# Patient Record
Sex: FEMALE
[De-identification: names, ages, dates, MRNs, and addresses within clinical notes are randomized per-mention and may not be internally consistent; named-entity substitution may affect disease eponyms.]

---

## 2019-04-15 ENCOUNTER — APPOINTMENT (OUTPATIENT)
Dept: ANTEPARTUM | Facility: CLINIC | Age: 39
End: 2019-04-15
Payer: COMMERCIAL

## 2019-04-15 PROBLEM — Z00.00 ENCOUNTER FOR PREVENTIVE HEALTH EXAMINATION: Status: ACTIVE | Noted: 2019-04-15

## 2019-04-15 PROCEDURE — 76817 TRANSVAGINAL US OBSTETRIC: CPT

## 2019-04-15 PROCEDURE — 76811 OB US DETAILED SNGL FETUS: CPT

## 2019-05-10 ENCOUNTER — APPOINTMENT (OUTPATIENT)
Dept: ANTEPARTUM | Facility: CLINIC | Age: 39
End: 2019-05-10
Payer: COMMERCIAL

## 2019-05-10 PROCEDURE — 76816 OB US FOLLOW-UP PER FETUS: CPT

## 2019-05-10 PROCEDURE — 76819 FETAL BIOPHYS PROFIL W/O NST: CPT

## 2019-05-10 PROCEDURE — 76820 UMBILICAL ARTERY ECHO: CPT

## 2019-05-31 ENCOUNTER — APPOINTMENT (OUTPATIENT)
Dept: ANTEPARTUM | Facility: CLINIC | Age: 39
End: 2019-05-31
Payer: COMMERCIAL

## 2019-05-31 PROCEDURE — 76819 FETAL BIOPHYS PROFIL W/O NST: CPT

## 2019-05-31 PROCEDURE — 76816 OB US FOLLOW-UP PER FETUS: CPT

## 2019-06-03 ENCOUNTER — APPOINTMENT (OUTPATIENT)
Dept: ULTRASOUND IMAGING | Facility: HOSPITAL | Age: 39
End: 2019-06-03

## 2019-06-18 ENCOUNTER — APPOINTMENT (OUTPATIENT)
Dept: ANTEPARTUM | Facility: CLINIC | Age: 39
End: 2019-06-18
Payer: COMMERCIAL

## 2019-06-18 PROCEDURE — 76816 OB US FOLLOW-UP PER FETUS: CPT

## 2019-06-18 PROCEDURE — 76819 FETAL BIOPHYS PROFIL W/O NST: CPT

## 2019-06-21 ENCOUNTER — INPATIENT (INPATIENT)
Facility: HOSPITAL | Age: 39
LOS: 2 days | Discharge: HOME | End: 2019-06-24
Attending: OBSTETRICS & GYNECOLOGY | Admitting: OBSTETRICS & GYNECOLOGY
Payer: COMMERCIAL

## 2019-06-21 VITALS — HEIGHT: 60 IN | WEIGHT: 138.89 LBS

## 2019-06-21 DIAGNOSIS — O26.899 OTHER SPECIFIED PREGNANCY RELATED CONDITIONS, UNSPECIFIED TRIMESTER: ICD-10-CM

## 2019-06-21 DIAGNOSIS — Z3A.00 WEEKS OF GESTATION OF PREGNANCY NOT SPECIFIED: ICD-10-CM

## 2019-06-21 LAB
BASOPHILS # BLD AUTO: 0.03 K/UL — SIGNIFICANT CHANGE UP (ref 0–0.2)
BASOPHILS NFR BLD AUTO: 0.3 % — SIGNIFICANT CHANGE UP (ref 0–2)
BLD GP AB SCN SERPL QL: NEGATIVE — SIGNIFICANT CHANGE UP
EOSINOPHIL # BLD AUTO: 0.08 K/UL — SIGNIFICANT CHANGE UP (ref 0–0.5)
EOSINOPHIL NFR BLD AUTO: 0.7 % — SIGNIFICANT CHANGE UP (ref 0–6)
HCT VFR BLD CALC: 37.7 % — SIGNIFICANT CHANGE UP (ref 34.5–45)
HGB BLD-MCNC: 12.4 G/DL — SIGNIFICANT CHANGE UP (ref 11.5–15.5)
IMM GRANULOCYTES NFR BLD AUTO: 0.4 % — SIGNIFICANT CHANGE UP (ref 0–1.5)
LYMPHOCYTES # BLD AUTO: 1.94 K/UL — SIGNIFICANT CHANGE UP (ref 1–3.3)
LYMPHOCYTES # BLD AUTO: 17.1 % — SIGNIFICANT CHANGE UP (ref 13–44)
MCHC RBC-ENTMCNC: 29.7 PG — SIGNIFICANT CHANGE UP (ref 27–34)
MCHC RBC-ENTMCNC: 32.9 GM/DL — SIGNIFICANT CHANGE UP (ref 32–36)
MCV RBC AUTO: 90.4 FL — SIGNIFICANT CHANGE UP (ref 80–100)
MONOCYTES # BLD AUTO: 0.95 K/UL — HIGH (ref 0–0.9)
MONOCYTES NFR BLD AUTO: 8.4 % — SIGNIFICANT CHANGE UP (ref 2–14)
NEUTROPHILS # BLD AUTO: 8.31 K/UL — HIGH (ref 1.8–7.4)
NEUTROPHILS NFR BLD AUTO: 73.1 % — SIGNIFICANT CHANGE UP (ref 43–77)
NRBC # BLD: 0 /100 WBCS — SIGNIFICANT CHANGE UP (ref 0–0)
PLATELET # BLD AUTO: 247 K/UL — SIGNIFICANT CHANGE UP (ref 150–400)
RBC # BLD: 4.17 M/UL — SIGNIFICANT CHANGE UP (ref 3.8–5.2)
RBC # FLD: 14.5 % — SIGNIFICANT CHANGE UP (ref 10.3–14.5)
RH IG SCN BLD-IMP: POSITIVE — SIGNIFICANT CHANGE UP
WBC # BLD: 11.36 K/UL — HIGH (ref 3.8–10.5)
WBC # FLD AUTO: 11.36 K/UL — HIGH (ref 3.8–10.5)

## 2019-06-21 RX ORDER — SODIUM CHLORIDE 9 MG/ML
1000 INJECTION, SOLUTION INTRAVENOUS
Refills: 0 | Status: DISCONTINUED | OUTPATIENT
Start: 2019-06-21 | End: 2019-06-22

## 2019-06-21 RX ORDER — OXYTOCIN 10 UNIT/ML
333.33 VIAL (ML) INJECTION
Qty: 20 | Refills: 0 | Status: DISCONTINUED | OUTPATIENT
Start: 2019-06-21 | End: 2019-06-24

## 2019-06-21 RX ORDER — DINOPROSTONE 10 MG/241MG
10 INSERT VAGINAL ONCE
Refills: 0 | Status: COMPLETED | OUTPATIENT
Start: 2019-06-21 | End: 2019-06-21

## 2019-06-21 RX ORDER — DINOPROSTONE 10 MG/241MG
10 INSERT VAGINAL ONCE
Refills: 0 | Status: DISCONTINUED | OUTPATIENT
Start: 2019-06-21 | End: 2019-06-21

## 2019-06-21 RX ORDER — CITRIC ACID/SODIUM CITRATE 300-500 MG
15 SOLUTION, ORAL ORAL EVERY 6 HOURS
Refills: 0 | Status: DISCONTINUED | OUTPATIENT
Start: 2019-06-21 | End: 2019-06-22

## 2019-06-21 RX ADMIN — SODIUM CHLORIDE 125 MILLILITER(S): 9 INJECTION, SOLUTION INTRAVENOUS at 22:05

## 2019-06-21 RX ADMIN — DINOPROSTONE 10 MILLIGRAM(S): 10 INSERT VAGINAL at 22:30

## 2019-06-22 LAB
T PALLIDUM AB TITR SER: NEGATIVE — SIGNIFICANT CHANGE UP

## 2019-06-22 RX ORDER — LANOLIN
1 OINTMENT (GRAM) TOPICAL EVERY 6 HOURS
Refills: 0 | Status: DISCONTINUED | OUTPATIENT
Start: 2019-06-22 | End: 2019-06-24

## 2019-06-22 RX ORDER — FENTANYL/BUPIVACAINE/NS/PF 2MCG/ML-.1
250 PLASTIC BAG, INJECTION (ML) INJECTION
Refills: 0 | Status: DISCONTINUED | OUTPATIENT
Start: 2019-06-22 | End: 2019-06-23

## 2019-06-22 RX ORDER — CEFAZOLIN SODIUM 1 G
2000 VIAL (EA) INJECTION ONCE
Refills: 0 | Status: COMPLETED | OUTPATIENT
Start: 2019-06-22 | End: 2019-06-22

## 2019-06-22 RX ORDER — TETANUS TOXOID, REDUCED DIPHTHERIA TOXOID AND ACELLULAR PERTUSSIS VACCINE, ADSORBED 5; 2.5; 8; 8; 2.5 [IU]/.5ML; [IU]/.5ML; UG/.5ML; UG/.5ML; UG/.5ML
0.5 SUSPENSION INTRAMUSCULAR ONCE
Refills: 0 | Status: DISCONTINUED | OUTPATIENT
Start: 2019-06-22 | End: 2019-06-24

## 2019-06-22 RX ORDER — SIMETHICONE 80 MG/1
80 TABLET, CHEWABLE ORAL EVERY 4 HOURS
Refills: 0 | Status: DISCONTINUED | OUTPATIENT
Start: 2019-06-22 | End: 2019-06-24

## 2019-06-22 RX ORDER — OXYCODONE HYDROCHLORIDE 5 MG/1
5 TABLET ORAL ONCE
Refills: 0 | Status: DISCONTINUED | OUTPATIENT
Start: 2019-06-22 | End: 2019-06-23

## 2019-06-22 RX ORDER — GLYCERIN ADULT
1 SUPPOSITORY, RECTAL RECTAL AT BEDTIME
Refills: 0 | Status: DISCONTINUED | OUTPATIENT
Start: 2019-06-22 | End: 2019-06-24

## 2019-06-22 RX ORDER — AZITHROMYCIN 500 MG/1
500 TABLET, FILM COATED ORAL ONCE
Refills: 0 | Status: DISCONTINUED | OUTPATIENT
Start: 2019-06-22 | End: 2019-06-24

## 2019-06-22 RX ORDER — OXYCODONE HYDROCHLORIDE 5 MG/1
5 TABLET ORAL
Refills: 0 | Status: DISCONTINUED | OUTPATIENT
Start: 2019-06-22 | End: 2019-06-23

## 2019-06-22 RX ORDER — OXYTOCIN 10 UNIT/ML
1 VIAL (ML) INJECTION
Qty: 30 | Refills: 0 | Status: DISCONTINUED | OUTPATIENT
Start: 2019-06-22 | End: 2019-06-24

## 2019-06-22 RX ORDER — MAGNESIUM HYDROXIDE 400 MG/1
30 TABLET, CHEWABLE ORAL
Refills: 0 | Status: DISCONTINUED | OUTPATIENT
Start: 2019-06-22 | End: 2019-06-24

## 2019-06-22 RX ORDER — DIPHENHYDRAMINE HCL 50 MG
25 CAPSULE ORAL EVERY 6 HOURS
Refills: 0 | Status: DISCONTINUED | OUTPATIENT
Start: 2019-06-22 | End: 2019-06-24

## 2019-06-22 RX ORDER — IBUPROFEN 200 MG
600 TABLET ORAL EVERY 6 HOURS
Refills: 0 | Status: COMPLETED | OUTPATIENT
Start: 2019-06-22 | End: 2020-05-20

## 2019-06-22 RX ORDER — MORPHINE SULFATE 50 MG/1
4 CAPSULE, EXTENDED RELEASE ORAL ONCE
Refills: 0 | Status: DISCONTINUED | OUTPATIENT
Start: 2019-06-22 | End: 2019-06-23

## 2019-06-22 RX ORDER — OXYTOCIN 10 UNIT/ML
333.33 VIAL (ML) INJECTION
Qty: 20 | Refills: 0 | Status: DISCONTINUED | OUTPATIENT
Start: 2019-06-22 | End: 2019-06-24

## 2019-06-22 RX ORDER — DOCUSATE SODIUM 100 MG
100 CAPSULE ORAL
Refills: 0 | Status: DISCONTINUED | OUTPATIENT
Start: 2019-06-22 | End: 2019-06-24

## 2019-06-22 RX ORDER — KETOROLAC TROMETHAMINE 30 MG/ML
30 SYRINGE (ML) INJECTION EVERY 6 HOURS
Refills: 0 | Status: DISCONTINUED | OUTPATIENT
Start: 2019-06-22 | End: 2019-06-23

## 2019-06-22 RX ORDER — ACETAMINOPHEN 500 MG
975 TABLET ORAL EVERY 4 HOURS
Refills: 0 | Status: DISCONTINUED | OUTPATIENT
Start: 2019-06-22 | End: 2019-06-24

## 2019-06-22 RX ADMIN — Medication 100 MILLIGRAM(S): at 17:20

## 2019-06-22 RX ADMIN — Medication 1 MILLIUNIT(S)/MIN: at 07:54

## 2019-06-22 RX ADMIN — Medication 975 MILLIGRAM(S): at 23:50

## 2019-06-22 RX ADMIN — SODIUM CHLORIDE 125 MILLILITER(S): 9 INJECTION, SOLUTION INTRAVENOUS at 08:27

## 2019-06-22 RX ADMIN — Medication 30 MILLIGRAM(S): at 19:54

## 2019-06-22 RX ADMIN — SODIUM CHLORIDE 125 MILLILITER(S): 9 INJECTION, SOLUTION INTRAVENOUS at 01:21

## 2019-06-22 RX ADMIN — Medication 975 MILLIGRAM(S): at 22:10

## 2019-06-22 RX ADMIN — Medication 30 MILLIGRAM(S): at 22:10

## 2019-06-22 RX ADMIN — Medication 15 MILLILITER(S): at 17:21

## 2019-06-23 LAB
ANISOCYTOSIS BLD QL: SLIGHT — SIGNIFICANT CHANGE UP
BASOPHILS # BLD AUTO: 0 K/UL — SIGNIFICANT CHANGE UP (ref 0–0.2)
BASOPHILS NFR BLD AUTO: 0 % — SIGNIFICANT CHANGE UP (ref 0–2)
EOSINOPHIL # BLD AUTO: 0 K/UL — SIGNIFICANT CHANGE UP (ref 0–0.5)
EOSINOPHIL NFR BLD AUTO: 0 % — SIGNIFICANT CHANGE UP (ref 0–6)
GIANT PLATELETS BLD QL SMEAR: PRESENT — SIGNIFICANT CHANGE UP
HCT VFR BLD CALC: 29.1 % — LOW (ref 34.5–45)
HGB BLD-MCNC: 9.4 G/DL — LOW (ref 11.5–15.5)
LYMPHOCYTES # BLD AUTO: 1.99 K/UL — SIGNIFICANT CHANGE UP (ref 1–3.3)
LYMPHOCYTES # BLD AUTO: 9.6 % — LOW (ref 13–44)
MACROCYTES BLD QL: SLIGHT — SIGNIFICANT CHANGE UP
MANUAL SMEAR VERIFICATION: SIGNIFICANT CHANGE UP
MCHC RBC-ENTMCNC: 29.8 PG — SIGNIFICANT CHANGE UP (ref 27–34)
MCHC RBC-ENTMCNC: 32.3 GM/DL — SIGNIFICANT CHANGE UP (ref 32–36)
MCV RBC AUTO: 92.4 FL — SIGNIFICANT CHANGE UP (ref 80–100)
MICROCYTES BLD QL: SLIGHT — SIGNIFICANT CHANGE UP
MONOCYTES # BLD AUTO: 0.54 K/UL — SIGNIFICANT CHANGE UP (ref 0–0.9)
MONOCYTES NFR BLD AUTO: 2.6 % — SIGNIFICANT CHANGE UP (ref 2–14)
NEUTROPHILS # BLD AUTO: 18.2 K/UL — HIGH (ref 1.8–7.4)
NEUTROPHILS NFR BLD AUTO: 86.1 % — HIGH (ref 43–77)
NEUTS BAND # BLD: 1.7 % — SIGNIFICANT CHANGE UP (ref 0–8)
OVALOCYTES BLD QL SMEAR: SLIGHT — SIGNIFICANT CHANGE UP
PLAT MORPH BLD: ABNORMAL
PLATELET # BLD AUTO: 243 K/UL — SIGNIFICANT CHANGE UP (ref 150–400)
PLATELET COUNT - ESTIMATE: NORMAL — SIGNIFICANT CHANGE UP
POLYCHROMASIA BLD QL SMEAR: SLIGHT — SIGNIFICANT CHANGE UP
RBC # BLD: 3.15 M/UL — LOW (ref 3.8–5.2)
RBC # FLD: 14.5 % — SIGNIFICANT CHANGE UP (ref 10.3–14.5)
RBC BLD AUTO: ABNORMAL
WBC # BLD: 20.73 K/UL — HIGH (ref 3.8–10.5)
WBC # FLD AUTO: 20.73 K/UL — HIGH (ref 3.8–10.5)

## 2019-06-23 RX ORDER — HEPARIN SODIUM 5000 [USP'U]/ML
5000 INJECTION INTRAVENOUS; SUBCUTANEOUS EVERY 12 HOURS
Refills: 0 | Status: DISCONTINUED | OUTPATIENT
Start: 2019-06-23 | End: 2019-06-24

## 2019-06-23 RX ORDER — SODIUM CHLORIDE 9 MG/ML
1000 INJECTION, SOLUTION INTRAVENOUS
Refills: 0 | Status: DISCONTINUED | OUTPATIENT
Start: 2019-06-23 | End: 2019-06-24

## 2019-06-23 RX ORDER — IBUPROFEN 200 MG
600 TABLET ORAL EVERY 6 HOURS
Refills: 0 | Status: DISCONTINUED | OUTPATIENT
Start: 2019-06-23 | End: 2019-06-24

## 2019-06-23 RX ADMIN — Medication 1 APPLICATION(S): at 00:56

## 2019-06-23 RX ADMIN — HEPARIN SODIUM 5000 UNIT(S): 5000 INJECTION INTRAVENOUS; SUBCUTANEOUS at 18:26

## 2019-06-23 RX ADMIN — Medication 600 MILLIGRAM(S): at 21:00

## 2019-06-23 RX ADMIN — Medication 975 MILLIGRAM(S): at 19:07

## 2019-06-23 RX ADMIN — Medication 975 MILLIGRAM(S): at 04:04

## 2019-06-23 RX ADMIN — Medication 30 MILLIGRAM(S): at 02:00

## 2019-06-23 RX ADMIN — Medication 30 MILLIGRAM(S): at 08:56

## 2019-06-23 RX ADMIN — Medication 975 MILLIGRAM(S): at 04:00

## 2019-06-23 RX ADMIN — Medication 975 MILLIGRAM(S): at 10:25

## 2019-06-23 RX ADMIN — Medication 600 MILLIGRAM(S): at 20:38

## 2019-06-23 RX ADMIN — Medication 30 MILLIGRAM(S): at 04:06

## 2019-06-23 RX ADMIN — SIMETHICONE 80 MILLIGRAM(S): 80 TABLET, CHEWABLE ORAL at 06:57

## 2019-06-23 RX ADMIN — Medication 30 MILLIGRAM(S): at 08:15

## 2019-06-23 RX ADMIN — Medication 975 MILLIGRAM(S): at 18:27

## 2019-06-23 RX ADMIN — Medication 100 MILLIGRAM(S): at 06:58

## 2019-06-23 RX ADMIN — Medication 975 MILLIGRAM(S): at 23:57

## 2019-06-23 RX ADMIN — Medication 975 MILLIGRAM(S): at 11:00

## 2019-06-23 NOTE — PROGRESS NOTE ADULT - SUBJECTIVE AND OBJECTIVE BOX
Patient evaluated at bedside.   She reports pain is well controlled.  She denies headache, dizziness, chest pain, palpitations, shortness of breathe, nausea, vomiting or heavy vaginal bleeding.  She has a Connolly and SCDs, has not yet ambulated or passed gas, is tolerating clears, and is breastfeeding.    Physical Exam:  Vital Signs Last 24 Hrs  T(C): 36.9 (23 Jun 2019 06:25), Max: 37.1 (22 Jun 2019 21:00)  T(F): 98.5 (23 Jun 2019 06:25), Max: 98.8 (22 Jun 2019 21:00)  HR: 72 (23 Jun 2019 06:25) (68 - 91)  BP: 100/72 (23 Jun 2019 06:25) (11/59 - 129/60)  BP(mean): --  RR: 17 (23 Jun 2019 06:25) (16 - 18)  SpO2: 97% (23 Jun 2019 06:25) (97% - 99%)    06-22 @ 07:01  -  06-23 @ 07:00  --------------------------------------------------------  IN: 2750 mL / OUT: 3725 mL / NET: -975 mL    06-23 @ 07:01  -  06-23 @ 08:21  --------------------------------------------------------  IN: 125 mL / OUT: 0 mL / NET: 125 mL        GA: NAD, A+0 x 3  CV: RRR  Pulm: Normal work of breathing  Breasts: soft, nontender, no palpable masses  Abd: + BS, soft, nontender, nondistended, no rebound or guarding, uterus firm at midline, 2 fb below umbilicus  Incision: Prevena in place  : lochia WNL  Extremities: no swelling or calf tenderness                            12.4   11.36 )-----------( 247      ( 21 Jun 2019 21:44 )             37.7

## 2019-06-23 NOTE — PROGRESS NOTE ADULT - ASSESSMENT
A/P  38y  s/p , POD #1, stable  - Pain: Motrin or Percocet prn  - GI: advance to reg diet  - : GARRET burdick  - DVT prophylaxis: SQH 5000u q12  - Heme: f/u AM CBC  - Dispo: POD3 or 4 A/P  38y  s/p , POD #1, stable  - Pain: Motrin - avoid percocet due to difficulty urinating	  - GI: advance to reg diet  - : GARRET burdick  - DVT prophylaxis: SQH 5000u q12  - Heme: f/u AM CBC  - Dispo: POD3 or 4

## 2019-06-24 ENCOUNTER — TRANSCRIPTION ENCOUNTER (OUTPATIENT)
Age: 39
End: 2019-06-24

## 2019-06-24 VITALS
HEART RATE: 74 BPM | RESPIRATION RATE: 18 BRPM | SYSTOLIC BLOOD PRESSURE: 111 MMHG | DIASTOLIC BLOOD PRESSURE: 70 MMHG | TEMPERATURE: 98 F | OXYGEN SATURATION: 100 %

## 2019-06-24 PROCEDURE — 86850 RBC ANTIBODY SCREEN: CPT

## 2019-06-24 PROCEDURE — 86901 BLOOD TYPING SEROLOGIC RH(D): CPT

## 2019-06-24 PROCEDURE — 99214 OFFICE O/P EST MOD 30 MIN: CPT

## 2019-06-24 PROCEDURE — 86900 BLOOD TYPING SEROLOGIC ABO: CPT

## 2019-06-24 PROCEDURE — 86780 TREPONEMA PALLIDUM: CPT

## 2019-06-24 PROCEDURE — 36415 COLL VENOUS BLD VENIPUNCTURE: CPT

## 2019-06-24 PROCEDURE — 85025 COMPLETE CBC W/AUTO DIFF WBC: CPT

## 2019-06-24 RX ADMIN — Medication 600 MILLIGRAM(S): at 09:33

## 2019-06-24 RX ADMIN — Medication 975 MILLIGRAM(S): at 06:45

## 2019-06-24 RX ADMIN — HEPARIN SODIUM 5000 UNIT(S): 5000 INJECTION INTRAVENOUS; SUBCUTANEOUS at 06:09

## 2019-06-24 RX ADMIN — Medication 975 MILLIGRAM(S): at 13:40

## 2019-06-24 RX ADMIN — Medication 600 MILLIGRAM(S): at 16:05

## 2019-06-24 RX ADMIN — Medication 100 MILLIGRAM(S): at 06:12

## 2019-06-24 RX ADMIN — Medication 975 MILLIGRAM(S): at 00:30

## 2019-06-24 RX ADMIN — Medication 600 MILLIGRAM(S): at 10:05

## 2019-06-24 RX ADMIN — Medication 975 MILLIGRAM(S): at 13:08

## 2019-06-24 RX ADMIN — Medication 975 MILLIGRAM(S): at 06:10

## 2019-06-24 RX ADMIN — Medication 100 MILLIGRAM(S): at 09:32

## 2019-06-24 RX ADMIN — SIMETHICONE 80 MILLIGRAM(S): 80 TABLET, CHEWABLE ORAL at 06:12

## 2019-06-24 NOTE — DISCHARGE NOTE OB - CALL YOUR HEALTHCARE PROVIDER IF YOU ARE EXPERIENCING SYMPTOMS OF DEPRESSION THAT LAST MORE THAN THREE DAYS
Medication(s) to Refill:   Pending Prescriptions Disp Refills    VENLAFAXINE HCL 37.5 MG Oral Tab [Pharmacy Med Name: Venlafaxine HCl Oral Tablet 37.5 MG] 60 tablet 1     Sig: TAKE 1 TABLET BY MOUTH TWO TIMES A DAY        Signed Prescriptions Disp Refills
Statement Selected

## 2019-06-24 NOTE — DISCHARGE NOTE OB - PATIENT PORTAL LINK FT
You can access the [facility] Patient Portal, offered by [facility], by registering with the following website: [email or web address]

## 2019-06-24 NOTE — DISCHARGE NOTE OB - CARE PROVIDER_API CALL
Danyelle Mohamud ()  Obstetrics and Gynecology  [address] [address]  Phone: [phone]  Fax: [phone]  Follow Up Time:

## 2019-06-24 NOTE — DISCHARGE NOTE OB - CARE PLAN
Principal Discharge DX:	 delivery delivered  Goal:	Routine recovery  Assessment and plan of treatment:	Normal  delivery care, ambulation, healthy diet, baby care.

## 2019-06-24 NOTE — LACTATION INITIAL EVALUATION - NS LACT CON REASON FOR REQ
Pt. is a P1 at 40.0 wks. gestation via .  Pt. denies medical problems during the pregnancy.  Baby is 45 hrs. old has had 8 voids, 5 stools, weight loss 6.84%..  Baby has received formula according to pt. due to baby not latching.   Discusse with pt. if she wants to encourage a milk supply she needs to provide stimulation to her breasts.  Attempted to latch baby to breast, areolas are dense so difficult to compress nipples.   Reverse pressure softening performed and nipple everted slightly and colostrum noted.  Baby placed to breast and baby made attempts to latch.  Baby breifly able to obtain a latch but shallow and pt. aware of pinching and biting.  A tongue was not observed.   Discussed with pt.  she can continue to formula feed baby but if she wanted bring her milk supply in she need s to provide consistent stimulation to breasts via pumping.  Pt. verbalized she wanted to start pumping.  Triple feed plan reviewed with pt. and triple feed routine explained to pt.  Pt. instructed on use of pump and 0.4 ml of colostrum obtained.  Pt understands this will be saved for next feeding session.  Pt. understands she will offer breast to baby, supplement with colostrum/formula, followed with pumping and savin collected colostrum for next feeding session.  Answered pt.'s questions../primaparous mom Pt. is a P1 at 40.0 wks. gestation via .  Pt. denies medical problems during the pregnancy.  Baby is 45 hrs. old has had 8 voids, 5 stools, weight loss 6.84%.  Baby has received formula according to pt., due to baby not latching.   Discusse with pt. if she wants to encourage a milk supply she needs to provide stimulation to her breasts.  Attempted to latch baby to breast, areolas are dense so difficult to compress nipples.   Reverse pressure softening performed and nipple everted slightly and colostrum noted.  Baby placed to breast and baby made attempts to latch.  Baby breifly able to obtain a latch but shallow and pt. aware of pinching and biting.  Baby removed from breast.  A tongue was not observed.   Discussed with pt.  she can continue to formula feed baby but if she wanted bring her milk supply to come in she need s to provide consistent stimulation to breasts via pumping.  Pt. verbalized she wanted to start pumping.  Triple feed plan reviewed with pt. and triple feed routine explained to pt.  Pt. instructed on use of pump and 0.4 ml of colostrum obtained.  Pt understands this will be saved for next feeding session.  Pt. understands she will offer breast to baby, supplement with colostrum/formula, followed with pumping and saving collected colostrum for next feeding session.  Provided written guidelines for supplementation amounts and reviewed with pt.  Encouraged to follow up with a private LC to assist with latching baby to breast.  Provided resources to locate LC.  Answered pt.'s questions../primaparous mom Pt. is a P1 at 40.0 wks. gestation via .  Pt. denies medical problems during the pregnancy.  Baby is 45 hrs. old has had 8 voids, 5 stools, weight loss 6.84%.  Baby has received formula according to pt., due to baby not latching.   Discusse with pt. if she wants to encourage a milk supply she needs to provide stimulation to her breasts.  Attempted to latch baby to breast, areolas are dense so difficult to compress nipples.   Reverse pressure softening performed and nipple everted slightly and colostrum noted.  Baby placed to breast and baby made attempts to latch.  Baby briefly able to obtain a latch but shallow and pt. aware of pinching and biting.  Baby removed from breast.  A tongue tie was not observed.   Discussed with pt.  she can continue to formula feed baby but if she wanted to bring her milk supply in she need s to provide consistent stimulation to breasts via pumping.  Pt. verbalized she wanted to start pumping.  Triple feed plan routine explained to pt.  Pt. instructed on use of pump and 0.4 ml of colostrum obtained.  Pt understands this will be saved for next feeding session.  Pt. understands she will offer breast to baby, supplement with colostrum/formula, followed with pumping and saving collected colostrum for next feeding session.  Provided written guidelines for supplementation amounts and reviewed with pt.  Encouraged to follow up with a private LC to assist with latching baby to breast.  Provided resources to locate LC.  Answered pt.'s questions../primaparous mom

## 2019-06-24 NOTE — PROGRESS NOTE ADULT - SUBJECTIVE AND OBJECTIVE BOX
POD#2 NOTE    Pt s/p 1*LTCS,for Arrest of Dilation POD#2 with no complaints.  Pt denies any cp, sob, palpitations.  Pt admits to BM, flatus.  Is ambulating well and voiding.  Pain well controlled.     VSS,.  Post Op Hct 29    Gen: alert and oriented x 3  abd: soft, NT. fundus below umblicius  Incision: provena in place.  No output  Ext: 1+ edema. No calf tenderness    A/P  Pt is s/p 1* LTCS POD#2 doing well.  meeting all milestones    -Cont PO care  -Encourage ambulation  -PO pain meds  -Possible d/c in AM POD#3

## 2019-06-25 ENCOUNTER — APPOINTMENT (OUTPATIENT)
Dept: ANTEPARTUM | Facility: CLINIC | Age: 39
End: 2019-06-25

## 2019-06-27 DIAGNOSIS — Z34.83 ENCOUNTER FOR SUPERVISION OF OTHER NORMAL PREGNANCY, THIRD TRIMESTER: ICD-10-CM

## 2019-06-27 DIAGNOSIS — Z3A.40 40 WEEKS GESTATION OF PREGNANCY: ICD-10-CM

## 2019-06-27 DIAGNOSIS — O36.8130 DECREASED FETAL MOVEMENTS, THIRD TRIMESTER, NOT APPLICABLE OR UNSPECIFIED: ICD-10-CM

## 2019-06-27 DIAGNOSIS — O41.03X0 OLIGOHYDRAMNIOS, THIRD TRIMESTER, NOT APPLICABLE OR UNSPECIFIED: ICD-10-CM

## 2021-06-30 NOTE — LACTATION INITIAL EVALUATION - PRO FEEDING PLAN INFANT OB
breastfeeding exclusively
Results reported to patient--grossly benign, labs unremarkable, CT shows no acute pathology   Pt. reports feeling better after meds  pt. agrees to f/u with primary care outpt. asap, referred to neuro for f/u   pt. understands to return to ED if symptoms worsen; will d/c